# Patient Record
Sex: FEMALE | Race: BLACK OR AFRICAN AMERICAN | Employment: FULL TIME | URBAN - METROPOLITAN AREA
[De-identification: names, ages, dates, MRNs, and addresses within clinical notes are randomized per-mention and may not be internally consistent; named-entity substitution may affect disease eponyms.]

---

## 2017-07-20 ENCOUNTER — OFFICE VISIT (OUTPATIENT)
Dept: OBGYN CLINIC | Age: 37
End: 2017-07-20
Payer: COMMERCIAL

## 2017-07-20 VITALS — HEIGHT: 63 IN | BODY MASS INDEX: 31.71 KG/M2 | WEIGHT: 179 LBS

## 2017-07-20 DIAGNOSIS — D25.9 UTERINE LEIOMYOMA, UNSPECIFIED LOCATION: ICD-10-CM

## 2017-07-20 DIAGNOSIS — Z80.3 FAMILY HISTORY OF BREAST CANCER IN MOTHER: ICD-10-CM

## 2017-07-20 DIAGNOSIS — K59.09 CHRONIC CONSTIPATION: ICD-10-CM

## 2017-07-20 DIAGNOSIS — Z01.419 ENCOUNTER FOR ANNUAL ROUTINE GYNECOLOGICAL EXAMINATION: ICD-10-CM

## 2017-07-20 DIAGNOSIS — Z12.31 ENCOUNTER FOR MAMMOGRAM TO ESTABLISH BASELINE MAMMOGRAM: Primary | ICD-10-CM

## 2017-07-20 PROCEDURE — 99395 PREV VISIT EST AGE 18-39: CPT | Performed by: OBSTETRICS & GYNECOLOGY

## 2017-07-27 DIAGNOSIS — Z12.31 ENCOUNTER FOR MAMMOGRAM TO ESTABLISH BASELINE MAMMOGRAM: ICD-10-CM

## 2018-08-14 ENCOUNTER — OFFICE VISIT (OUTPATIENT)
Dept: FAMILY MEDICINE CLINIC | Age: 38
End: 2018-08-14
Payer: COMMERCIAL

## 2018-08-14 ENCOUNTER — OFFICE VISIT (OUTPATIENT)
Dept: OBGYN CLINIC | Age: 38
End: 2018-08-14
Payer: COMMERCIAL

## 2018-08-14 VITALS
SYSTOLIC BLOOD PRESSURE: 118 MMHG | WEIGHT: 179 LBS | DIASTOLIC BLOOD PRESSURE: 72 MMHG | BODY MASS INDEX: 31.71 KG/M2 | RESPIRATION RATE: 16 BRPM | HEIGHT: 63 IN | HEART RATE: 76 BPM

## 2018-08-14 VITALS
HEIGHT: 63 IN | BODY MASS INDEX: 31.71 KG/M2 | DIASTOLIC BLOOD PRESSURE: 72 MMHG | WEIGHT: 179 LBS | SYSTOLIC BLOOD PRESSURE: 118 MMHG

## 2018-08-14 DIAGNOSIS — N94.10 DYSPAREUNIA IN FEMALE: ICD-10-CM

## 2018-08-14 DIAGNOSIS — N63.10 LUMP OF RIGHT BREAST: ICD-10-CM

## 2018-08-14 DIAGNOSIS — R10.2 PELVIC PAIN: ICD-10-CM

## 2018-08-14 DIAGNOSIS — Z01.411 ENCOUNTER FOR GYNECOLOGICAL EXAMINATION WITH ABNORMAL FINDING: Primary | ICD-10-CM

## 2018-08-14 DIAGNOSIS — B37.9 CANDIDIASIS: ICD-10-CM

## 2018-08-14 DIAGNOSIS — N94.6 DYSMENORRHEA: ICD-10-CM

## 2018-08-14 DIAGNOSIS — D25.9 UTERINE LEIOMYOMA, UNSPECIFIED LOCATION: ICD-10-CM

## 2018-08-14 DIAGNOSIS — Z00.00 WELL ADULT EXAM: Primary | ICD-10-CM

## 2018-08-14 PROCEDURE — 99395 PREV VISIT EST AGE 18-39: CPT | Performed by: OBSTETRICS & GYNECOLOGY

## 2018-08-14 PROCEDURE — 99395 PREV VISIT EST AGE 18-39: CPT | Performed by: FAMILY MEDICINE

## 2018-08-14 RX ORDER — NYSTATIN 100000 U/G
CREAM TOPICAL
Qty: 1 TUBE | Refills: 2 | Status: SHIPPED | OUTPATIENT
Start: 2018-08-14 | End: 2021-03-29

## 2018-08-14 ASSESSMENT — PATIENT HEALTH QUESTIONNAIRE - PHQ9
2. FEELING DOWN, DEPRESSED OR HOPELESS: 0
SUM OF ALL RESPONSES TO PHQ9 QUESTIONS 1 & 2: 0
SUM OF ALL RESPONSES TO PHQ QUESTIONS 1-9: 0
SUM OF ALL RESPONSES TO PHQ QUESTIONS 1-9: 0
SUM OF ALL RESPONSES TO PHQ9 QUESTIONS 1 & 2: 0
SUM OF ALL RESPONSES TO PHQ QUESTIONS 1-9: 0
SUM OF ALL RESPONSES TO PHQ QUESTIONS 1-9: 0
1. LITTLE INTEREST OR PLEASURE IN DOING THINGS: 0
1. LITTLE INTEREST OR PLEASURE IN DOING THINGS: 0
2. FEELING DOWN, DEPRESSED OR HOPELESS: 0

## 2018-08-14 NOTE — PROGRESS NOTES
[facility] PHYSICIANS  COMPREHENSIVE CARE  [facility] [address]  [address] [facility] [address]  Dept: [phone]      Lauryn Flynn is a 41 y.o. female who presents today for follow up on her  medical conditions as noted below. Chief Complaint   Patient presents with    Annual Exam       Patient Active Problem List:     Hemorrhoids     GERD (gastroesophageal reflux disease)     History of threatened premature labor     High-risk pregnancy      spontaneous labor with term delivery     Past Medical History:   Diagnosis Date    Dysplasia of cervix       Past Surgical History:   Procedure Laterality Date    COLPOSCOPY      INTRAUTERINE DEVICE INSERTION      Mirena  removed 10/12/10    TOOTH EXTRACTION  14    WISDOM TOOTH EXTRACTION       Family History   Problem Relation Age of Onset    Breast Cancer Mother 52    Migraines Mother     High Blood Pressure Mother     High Cholesterol Mother     Heart Attack Father     Diabetes Paternal Grandmother     Hypertension Paternal Grandmother     Breast Cancer Maternal Grandmother     Breast Cancer Maternal Aunt         pre-menopausal    Breast Cancer Other         post-menapausal    Hypertension Other        Current Outpatient Prescriptions   Medication Sig Dispense Refill    nystatin (MYCOSTATIN) 642406 UNIT/GM cream Apply topically 2 times daily. 1 Tube 2     No current facility-administered medications for this visit. ALLERGIES:  No Known Allergies    Social History   Substance Use Topics    Smoking status: Current Some Day Smoker     Types: Cigars    Smokeless tobacco: Never Used    Alcohol use 0.0 oz/week      Comment: socially.         No results found for: LDLCALC, LDLCHOLESTEROL, HDL, BUN, CREATININE, GLUCOSE, LABA1C, LABMICR           Subjective:      HPI  She is here today for well visit she essentially is feeling fine she had a Pap today she is due for mammogram laboratory studies done she does have some yeast under her breasts is tried some over-the-counter powder which is not helping her    Review of Systems:     Constitutional: Negative for fever, appetite change and fatigue. Family social and medical history reviewed and unchanged     HENT: Negative. Negative for nosebleeds, trouble swallowing and neck pain. Eyes: Negative for photophobia and visual disturbance. Respiratory: Negative. Negative for chest tightness and shortness of breath. Cardiovascular: Negative. Negative for chest pain and leg swelling. Gastrointestinal: Negative. Negative for abdominal pain and blood in stool. Endocrine: Negative for cold intolerance and polyuria. Genitourinary: Negative for dysuria and hematuria. Musculoskeletal: Negative. Skin: Negative for rash. Allergic/Immunologic: Negative. Neurological: Negative. Negative for dizziness, weakness and numbness. Hematological: Negative. Negative for adenopathy. Does not bruise/bleed easily. Psychiatric/Behavioral: Negative for sleep disturbance, dysphoric mood and  decreased concentration. The patient is not nervous/anxious. Objective:     Physical Exam:     Nursing note and vitals reviewed. /72   Pulse 76   Resp 16   Ht 5' 3\" (1.6 m)   Wt 179 lb (81.2 kg)   LMP 08/02/2018   Breastfeeding? No   BMI 31.71 kg/m²   Constitutional: She is oriented to person, place, and time. She   appears well-developed and well-nourished. HENT:   Head: Normocephalic and atraumatic. Right Ear: External ear normal. Tympanic membrane is not erythematous. No middle ear effusion. Left Ear: External ear normal. Tympanic membrane is not erythematous. No middle ear effusion. Nose: No mucosal edema. Mouth/Throat: Oropharynx is clear and moist. No posterior oropharyngeal erythema. Eyes: Conjunctivae and EOM are normal. Pupils are equal, round, and reactive to light. Neck: Normal range of motion. Neck supple. No thyromegaly present.    Cardiovascular: Normal rate, regular rhythm and normal heart sounds. No murmur heard. Pulmonary/Chest: Effort normal and breath sounds normal. She has no wheezes. Shehas no rales. Abdominal: Soft. Bowel sounds are normal. She exhibits no distension and no mass. There is no tenderness. There is no rebound and no guarding. Genitourinary/Anorectal:deferred  Musculoskeletal: Normal range of motion. She exhibits no edema or tenderness. Lymphadenopathy: She has no cervical adenopathy. Neurological: She is alert and oriented to person, place, and time. She has normal reflexes. Skin: Skin is warm and dry. No rash noted. Psychiatric: She has a normal mood and affect. Her   behavior is normal.       Assessment:      1. Well adult exam    2. Candidiasis          Plan:      Call or return to clinic prn if these symptoms worsen or fail to improve as anticipated. I have reviewed the instructions with the patient, answering all questions to her satisfaction. No Follow-up on file.   Orders Placed This Encounter   Procedures    CBC Auto Differential     Standing Status:   Future     Standing Expiration Date:   8/14/2019    Comprehensive Metabolic Panel     Standing Status:   Future     Standing Expiration Date:   8/14/2019    Lipid Panel     Standing Status:   Future     Standing Expiration Date:   8/14/2019     Order Specific Question:   Is Patient Fasting?/# of Hours     Answer:   yes    T4, Free     Standing Status:   Future     Standing Expiration Date:   8/14/2019    Thyroid Peroxidase Antibody     Standing Status:   Future     Standing Expiration Date:   8/14/2019    TSH without Reflex     Standing Status:   Future     Standing Expiration Date:   8/14/2019    Vitamin D 25 Hydroxy     Standing Status:   Future     Standing Expiration Date:   8/14/2019    PTH, Intact     Standing Status:   Future     Standing Expiration Date:   8/14/2019    HIV Screen     Standing Status:   Future     Standing Expiration Date:   8/14/2019 Orders Placed This Encounter   Medications    nystatin (MYCOSTATIN) [id number] UNIT/GM cream     Sig: Apply topically 2 times daily. Dispense:  1 Tube     Refill:  2    will notify when get results and follow-up p.r.n.     Electronically signed by Ashanti Smyth DO on 8/14/2018 at 4:02 PM

## 2018-08-14 NOTE — PROGRESS NOTES
 0.0 oz/week     Comment: socially. Current Outpatient Prescriptions   Medication Sig Dispense Refill    nystatin (MYCOSTATIN) [id number] UNIT/GM cream Apply topically 2 times daily. 1 Tube 2     No current facility-administered medications for this visit. Allergies:  No Known Allergies    Gynecologic History:  Patient's last menstrual period was 2018. Sexually Active: Yes  STD History: Yes , HPV  Abnormal Pap History yes  Birth Control: Yes, vasectomy    Obstetric History       T1      L3     SAB0   TAB0   Ectopic0   Molar0   Multiple0   Live Births3      ______________________________________________________________________  REVIEW OF SYSTEMS:        Constitutional:  Unexpected weight change no  Neurological:  Frequent headaches  no  Ophthalmic:  Recent visual changes no  ENT:   Difficulty swallowing  no  Breast:              Masses   no     Respiratory:  Shortness of breath  no    Cardiovascular: Chest pain   no     Gastrointestinal: Chronic diarrhea/constipation yes   Urogenital:  Urinary incontinence  no                                         Heavy/irregular periods           no                                      Vaginal discharge                   no  Hematological: Bruises easy   no     Endocrine:  Nipple Discharge  no     Hot/Cold Intolerance  no   Psychological:  Mood and affect were wnl yes                                                                                                                                           Physical Exam:    Vitals:    18 1437   BP: 118/72   Site: Right Arm   Position: Sitting   Cuff Size: Medium Adult   Weight: 179 lb (81.2 kg)   Height: 5' 3\" (1.6 m)       General Appearance:  She does not appear to be in any distress. This  is a well developed, well nourished, well groomed female. Neurological:  The patient is alert and oriented to time, place, person, and situation without any noted sensory motor deficits.     Skin:  A follow-up. Return to the office in 1 year or when necessary  Patient was seen with total face to face time of 20 minutes. Leander Goddard M.D., M[facility]  [facility]  [place]

## 2018-08-21 LAB
ALBUMIN SERPL-MCNC: NORMAL G/DL
ALP BLD-CCNC: NORMAL U/L
ALT SERPL-CCNC: NORMAL U/L
ANION GAP SERPL CALCULATED.3IONS-SCNC: NORMAL MMOL/L
AST SERPL-CCNC: NORMAL U/L
BASOPHILS ABSOLUTE: NORMAL /ΜL
BASOPHILS RELATIVE PERCENT: NORMAL %
BILIRUB SERPL-MCNC: NORMAL MG/DL (ref 0.1–1.4)
BUN BLDV-MCNC: NORMAL MG/DL
CALCIUM SERPL-MCNC: NORMAL MG/DL
CHLORIDE BLD-SCNC: NORMAL MMOL/L
CHOLESTEROL, TOTAL: 156 MG/DL
CHOLESTEROL/HDL RATIO: 3.5
CO2: NORMAL MMOL/L
CREAT SERPL-MCNC: NORMAL MG/DL
EOSINOPHILS ABSOLUTE: NORMAL /ΜL
EOSINOPHILS RELATIVE PERCENT: NORMAL %
GFR CALCULATED: NORMAL
GLUCOSE BLD-MCNC: NORMAL MG/DL
HCT VFR BLD CALC: NORMAL % (ref 36–46)
HDLC SERPL-MCNC: 44 MG/DL (ref 35–70)
HEMOGLOBIN: NORMAL G/DL (ref 12–16)
HIV AG/AB: NORMAL
LDL CHOLESTEROL CALCULATED: 86 MG/DL (ref 0–160)
LYMPHOCYTES ABSOLUTE: NORMAL /ΜL
LYMPHOCYTES RELATIVE PERCENT: NORMAL %
MCH RBC QN AUTO: NORMAL PG
MCHC RBC AUTO-ENTMCNC: NORMAL G/DL
MCV RBC AUTO: NORMAL FL
MONOCYTES ABSOLUTE: NORMAL /ΜL
MONOCYTES RELATIVE PERCENT: NORMAL %
NEUTROPHILS ABSOLUTE: NORMAL /ΜL
NEUTROPHILS RELATIVE PERCENT: NORMAL %
PDW BLD-RTO: NORMAL %
PLATELET # BLD: NORMAL K/ΜL
PMV BLD AUTO: NORMAL FL
POTASSIUM SERPL-SCNC: NORMAL MMOL/L
PTH INTACT: NORMAL
RBC # BLD: NORMAL 10^6/ΜL
SODIUM BLD-SCNC: NORMAL MMOL/L
T4 FREE: NORMAL
TOTAL PROTEIN: NORMAL
TRIGL SERPL-MCNC: 132 MG/DL
TSH SERPL DL<=0.05 MIU/L-ACNC: NORMAL UIU/ML
VITAMIN D 25-HYDROXY: NORMAL
VITAMIN D2, 25 HYDROXY: NORMAL
VITAMIN D3,25 HYDROXY: NORMAL
VLDLC SERPL CALC-MCNC: 26 MG/DL
WBC # BLD: NORMAL 10^3/ML

## 2018-08-22 DIAGNOSIS — Z01.411 ENCOUNTER FOR GYNECOLOGICAL EXAMINATION WITH ABNORMAL FINDING: ICD-10-CM

## 2018-08-23 DIAGNOSIS — Z00.00 WELL ADULT EXAM: ICD-10-CM

## 2018-09-11 ENCOUNTER — TELEPHONE (OUTPATIENT)
Dept: OBGYN CLINIC | Age: 38
End: 2018-09-11

## 2019-09-17 ENCOUNTER — OFFICE VISIT (OUTPATIENT)
Dept: OBGYN CLINIC | Age: 39
End: 2019-09-17
Payer: COMMERCIAL

## 2019-09-17 VITALS
BODY MASS INDEX: 30.65 KG/M2 | HEIGHT: 63 IN | SYSTOLIC BLOOD PRESSURE: 131 MMHG | WEIGHT: 173 LBS | DIASTOLIC BLOOD PRESSURE: 87 MMHG | HEART RATE: 78 BPM

## 2019-09-17 DIAGNOSIS — N94.6 DYSMENORRHEA: ICD-10-CM

## 2019-09-17 DIAGNOSIS — D21.9 FIBROIDS: ICD-10-CM

## 2019-09-17 DIAGNOSIS — D25.9 UTERINE LEIOMYOMA, UNSPECIFIED LOCATION: ICD-10-CM

## 2019-09-17 DIAGNOSIS — Z01.419 ENCOUNTER FOR WELL WOMAN EXAM WITH ROUTINE GYNECOLOGICAL EXAM: Primary | ICD-10-CM

## 2019-09-17 DIAGNOSIS — N93.0 POSTCOITAL BLEEDING: ICD-10-CM

## 2019-09-17 DIAGNOSIS — Z12.31 ENCOUNTER FOR SCREENING MAMMOGRAM FOR MALIGNANT NEOPLASM OF BREAST: ICD-10-CM

## 2019-09-17 PROCEDURE — 99395 PREV VISIT EST AGE 18-39: CPT | Performed by: OBSTETRICS & GYNECOLOGY

## 2019-09-17 ASSESSMENT — PATIENT HEALTH QUESTIONNAIRE - PHQ9
SUM OF ALL RESPONSES TO PHQ9 QUESTIONS 1 & 2: 0
SUM OF ALL RESPONSES TO PHQ QUESTIONS 1-9: 0
SUM OF ALL RESPONSES TO PHQ QUESTIONS 1-9: 0
2. FEELING DOWN, DEPRESSED OR HOPELESS: 0
1. LITTLE INTEREST OR PLEASURE IN DOING THINGS: 0

## 2019-09-17 NOTE — PROGRESS NOTES
socially. Current Outpatient Medications   Medication Sig Dispense Refill    nystatin (MYCOSTATIN) [id number] UNIT/GM cream Apply topically 2 times daily. (Patient not taking: Reported on 2019) 1 Tube 2     No current facility-administered medications for this visit. Allergies:  No Known Allergies    Gynecologic History:  Patient's last menstrual period was 2019. Sexually Active: Yes  STD History: Yes, HPV  Abnormal Pap History yes  Birth Control: Yes, vasectomy    OB History    Para Term  AB Living   3 2 1 1 0 3   SAB TAB Ectopic Molar Multiple Live Births   0 0 0 0 0 3     ______________________________________________________________________  REVIEW OF SYSTEMS:        Constitutional:  Unexpected weight change no  Neurological:  Frequent headaches  no  Ophthalmic:  Recent visual changes no  ENT:   Difficulty swallowing  no  Breast:              Masses   no     Respiratory:  Shortness of breath  no    Cardiovascular: Chest pain   no     Gastrointestinal: Chronic diarrhea/constipation no   Urogenital:  Urinary incontinence  no                                         Heavy/irregular periods           yes                                      Vaginal discharge                   no  Hematological: Bruises easy   no     Endocrine:  Nipple Discharge  no     Hot/Cold Intolerance  no   Psychological:  Mood and affect were wnl yes                                                                                                                                           Physical Exam:    Vitals:    19 0937   BP: 131/87   Site: Left Upper Arm   Position: Sitting   Cuff Size: Medium Adult   Pulse: 78   Weight: 173 lb (78.5 kg)   Height: 5' 3\" (1.6 m)       General Appearance:  She does not appear to be in any distress. This  is a well developed, well nourished, well groomed female.         Neurological:  The patient is alert and oriented to time, place, person, and situation without any noted sensory motor deficits. Skin:  A brief inspection of the skin revealed no rashes or lesions. Neck:  The neck was supple. There is no tracheal deviation, thyromegaly or supraclavicular adenopathy appreciated. Breast:   The patients breasts were symmetrical.  Breasts are nontender and there  were no masses, discharge or pathologic skin changes. There is no supraclavicular or axillary adenopathy bilaterally. Respiratory: There was unlabored respiratory effort. Lungs clear to ascultation without wheezes, rales or rhonchi in all fields bilaterally. Cardiovascular:  Normal sinus rhythm with a regular rate and without murmur, rubs or gallops. Abdomen: The abdomen was soft and non-tender with no guarding, rebound, CVAT or rigidity. No hernias were appreciated. Bowel sounds were normally active. Pelvic exam:  No vulvar, vaginal or cervical lesions are noted. Normal vaginal discharge present, no significant cystocele, rectocele or enterocele noted. Uterus 12 to 14 weeks with a palpable anterior fundal fibroid and without CMT and adnexa nontender and without abnormal masses bilaterally. Extremities:  FROM and nontender without clubbing cyanosis or edema. ASSESSMENT:         ICD-10-CM    1. Encounter for well woman exam with routine gynecological exam Z01.419 PAP Smear   2. Uterine leiomyoma, unspecified location D25.9    3. Postcoital bleeding N93.0 US PELVIS COMPLETE     US Non OB Transvaginal   4. Fibroids D21.9 US PELVIS COMPLETE     US Non OB Transvaginal   5. Dysmenorrhea N94.6    6. Encounter for screening mammogram for malignant neoplasm of breast Z12.31 MARIANA DIGITAL SCREEN W OR WO CAD BILATERAL                   PLAN:  Read discussed E and M of uterine fibroids and she was given website references. Will discuss follow-up after ultrasound is resulted. Return to the office in 1 year or when necessary  Patient was seen with total face to face time of 20 minutes.      Grazyna Mcconnell Jodie Oh M.D., [address]  [facility]  [place]

## 2019-09-23 DIAGNOSIS — Z01.419 ENCOUNTER FOR WELL WOMAN EXAM WITH ROUTINE GYNECOLOGICAL EXAM: ICD-10-CM

## 2020-09-21 ENCOUNTER — OFFICE VISIT (OUTPATIENT)
Dept: OBGYN CLINIC | Age: 40
End: 2020-09-21
Payer: COMMERCIAL

## 2020-09-21 VITALS
SYSTOLIC BLOOD PRESSURE: 110 MMHG | HEIGHT: 63 IN | BODY MASS INDEX: 32.07 KG/M2 | DIASTOLIC BLOOD PRESSURE: 72 MMHG | WEIGHT: 181 LBS

## 2020-09-21 PROCEDURE — 99395 PREV VISIT EST AGE 18-39: CPT | Performed by: OBSTETRICS & GYNECOLOGY

## 2020-09-21 ASSESSMENT — PATIENT HEALTH QUESTIONNAIRE - PHQ9
SUM OF ALL RESPONSES TO PHQ QUESTIONS 1-9: 0
SUM OF ALL RESPONSES TO PHQ QUESTIONS 1-9: 0
2. FEELING DOWN, DEPRESSED OR HOPELESS: 0
SUM OF ALL RESPONSES TO PHQ9 QUESTIONS 1 & 2: 0
1. LITTLE INTEREST OR PLEASURE IN DOING THINGS: 0

## 2020-09-21 NOTE — PROGRESS NOTES
DATE OF VISIT:  20        History and Physical    Anirudh Winter    :  1980  CHIEF COMPLAINT:    Chief Complaint   Patient presents with    Annual Exam     Here for annual  Last pap 19 ascus HPV neg  Last mammogram 19 neg suggest MRI                     HPI :   Anirudh Winter is a 45 y.o. femaleGRAVIDA 3 PARA [address]003    Anirudh Winter returns today for her annual exam.  Ultrasound last year revealed multiple uterine fibroids both subserosal and one that appears to be submucosal.  She continues to have heavy, crampy menses and postcoital bleeding. Mammogram last year was negative and adjunct MRI was recommended. She is having regular bowel movements without constipation or diarrhea. She denies any involuntary loss of urine. She denies S/S consistent with COVID and is otherwise without any significant complaints today.   Mely Humphrey is still working at [employer] and her 3 children are all boys and doing fine.  _____________________________________________________________________  Past Medical History:   Diagnosis Date    Dysplasia of cervix                                                                    Past Surgical History:   Procedure Laterality Date    COLPOSCOPY      INTRAUTERINE DEVICE INSERTION      Mirena  removed 10/12/10    TOOTH EXTRACTION  15    WISDOM TOOTH EXTRACTION       Family History   Problem Relation Age of Onset    Breast Cancer Mother 52    Migraines Mother     High Blood Pressure Mother     High Cholesterol Mother     Heart Attack Father     Diabetes Paternal Grandmother     Hypertension Paternal Grandmother     Breast Cancer Maternal Grandmother     Breast Cancer Maternal Aunt         pre-menopausal    Breast Cancer Other         post-menapausal    Hypertension Other      Social History     Tobacco Use   Smoking Status Current Some Day Smoker    Types: Cigars   Smokeless Tobacco Never Used     Social History     Substance and Sexual Activity Alcohol Use Yes    Alcohol/week: 0.0 standard drinks    Comment: socially. Current Outpatient Medications   Medication Sig Dispense Refill    nystatin (MYCOSTATIN) [id number] UNIT/GM cream Apply topically 2 times daily. (Patient not taking: Reported on 2019) 1 Tube 2     No current facility-administered medications for this visit. Allergies:  No Known Allergies    Gynecologic History:  Patient's last menstrual period was 2020. Sexually Active: Yes  STD History: Yes, HR-HPV  Abnormal Pap History yes  Birth Control: Yes, jesus    OB History    Para Term  AB Living   3 2 1 1 0 3   SAB TAB Ectopic Molar Multiple Live Births   0 0 0 0 0 3     ______________________________________________________________________  REVIEW OF SYSTEMS:        Constitutional:  Unexpected weight change no  Neurological:  Frequent headaches  no  Ophthalmic:  Recent visual changes no  ENT:   Difficulty swallowing  no  Breast:              Masses   no     Respiratory:  Shortness of breath  no    Cardiovascular: Chest pain   no     Gastrointestinal: Chronic diarrhea/constipation no   Urogenital:  Urinary incontinence  no                                         Heavy/irregular periods           yes                                      Vaginal discharge                   no  Hematological: Bruises easy   no     Endocrine:  Nipple Discharge  no     Hot/Cold Intolerance  no   Psychological:  Mood and affect were wnl yes                                                                                                                                           Physical Exam:    Vitals:    20 0857   BP: 110/72   Site: Right Upper Arm   Position: Sitting   Cuff Size: Large Adult   Weight: 181 lb (82.1 kg)   Height: 5' 3\" (1.6 m)       General Appearance:  She does not appear to be in any distress. This  is a well developed, well nourished, well groomed female.         Neurological:  The patient is alert and oriented to time, place, person, and situation without any noted sensory motor deficits. Skin:  A brief inspection of the skin revealed no rashes or lesions. Neck:  The neck was supple. There is no tracheal deviation, thyromegaly or supraclavicular adenopathy appreciated. Breast:   The patients breasts were symmetrical.  Breasts are nontender and there  were no masses, discharge or pathologic skin changes. There is no supraclavicular or axillary adenopathy bilaterally. Respiratory: There was unlabored respiratory effort. Lungs clear to ascultation without wheezes, rales or rhonchi in all fields bilaterally. Cardiovascular:  Normal sinus rhythm with a regular rate and without murmur, rubs or gallops. Abdomen: The abdomen was soft and non-tender with no guarding, rebound, CVAT or rigidity. No hernias were appreciated. Bowel sounds were normally active. Pelvic exam:  No vulvar, vaginal or cervical lesions are noted. Normal vaginal discharge present, no significant cystocele, rectocele or enterocele noted. Uterus unchanged from last year, irregular in contour and approximately 14 weeks size. No CMT and adnexa nontender and without abnormal masses bilaterally. Extremities:  FROM and nontender without clubbing cyanosis or edema. ASSESSMENT:         ICD-10-CM    1. Encounter for gynecological examination with abnormal finding  Z01.411 PAP SMEAR   2. Visit for screening mammogram  Z12.31 MARIANA DIGITAL SCREEN W OR WO CAD BILATERAL     MRI BREAST BILATERAL W WO CONTRAST   3. Family history of malignant neoplasm of breast in relative diagnosed when older than 39years of age  Z80.2 MRI BREAST BILATERAL W [address]   4. Dysmenorrhea  N94.6    5. Submucous and subserous leiomyoma of uterus  D25.0     D25.2    6. Postcoital bleeding  N93.0                    PLAN:  Discussed genetic counseling due to family history of breast cancer and she desires this.   We discussed management of uterine fibroids

## 2021-03-29 ENCOUNTER — OFFICE VISIT (OUTPATIENT)
Dept: OBGYN CLINIC | Age: 41
End: 2021-03-29
Payer: COMMERCIAL

## 2021-03-29 VITALS
SYSTOLIC BLOOD PRESSURE: 132 MMHG | BODY MASS INDEX: 33.83 KG/M2 | HEART RATE: 75 BPM | DIASTOLIC BLOOD PRESSURE: 69 MMHG | TEMPERATURE: 98.6 F | WEIGHT: 191 LBS

## 2021-03-29 DIAGNOSIS — N63.0 BREAST LUMP: Primary | ICD-10-CM

## 2021-03-29 DIAGNOSIS — N63.10 BREAST MASS, RIGHT: Primary | ICD-10-CM

## 2021-03-29 PROCEDURE — 99213 OFFICE O/P EST LOW 20 MIN: CPT | Performed by: OBSTETRICS & GYNECOLOGY

## 2021-03-29 NOTE — PROGRESS NOTES
Physical Exam  Chest:      Chest wall: No mass, deformity, tenderness or edema. Breasts: Breasts are symmetrical.         Right: Mass present. No swelling, bleeding, inverted nipple, nipple discharge or tenderness. Left: Mass present. No swelling, bleeding, inverted nipple, nipple discharge, skin change or tenderness. Comments: Mike Long returns to the office today after discovering a lump in her right breast.  She states that she found it while showering in January. It is nontender and has not changed in size. She denies any previous history. She denies any changes in the skin, nipple discharge or asymmetry. She is otherwise without any complaints today. Physical exam:  -------------------------              03/29/21                    0920        -------------------------   BP:         132/69        Pulse:        75          Temp:   98.6 °F (37 °C)  -------------------------    On physical exam there is no gross asymmetry. In the right breast at the area in question there is a nontender mobile approximately 8-10 mm mass at 11:00 consistent with a fibroadenoma. There are also similar changes in the left breast at 11 and 3:00. Diagnostic bilateral mammogram with ultrasound was ordered. She will be contacted with results and any recommendations. Plan to follow-up at annual exam in September. Lymphadenopathy:      Upper Body:      Right upper body: No supraclavicular, axillary or pectoral adenopathy. Left upper body: No supraclavicular, axillary or pectoral adenopathy.

## 2021-03-29 NOTE — PATIENT INSTRUCTIONS
We will order a diagnostic mammogram and ultrasound of the right breast.  You will still have routine screening on the left breast.  We will contact you with those results and any recommendations. Otherwise plan on following up for your annual exam in September.

## 2021-03-30 LAB — MAMMOGRAPHY, EXTERNAL: NORMAL

## 2021-04-01 DIAGNOSIS — N63.10 BREAST MASS, RIGHT: ICD-10-CM

## 2021-04-14 ENCOUNTER — OFFICE VISIT (OUTPATIENT)
Dept: FAMILY MEDICINE CLINIC | Age: 41
End: 2021-04-14
Payer: COMMERCIAL

## 2021-04-14 VITALS
BODY MASS INDEX: 33.31 KG/M2 | WEIGHT: 188 LBS | HEART RATE: 73 BPM | TEMPERATURE: 97.7 F | HEIGHT: 63 IN | SYSTOLIC BLOOD PRESSURE: 126 MMHG | DIASTOLIC BLOOD PRESSURE: 80 MMHG | OXYGEN SATURATION: 99 %

## 2021-04-14 DIAGNOSIS — D64.9 ANEMIA, UNSPECIFIED TYPE: ICD-10-CM

## 2021-04-14 DIAGNOSIS — Z00.00 WELL ADULT EXAM: Primary | ICD-10-CM

## 2021-04-14 DIAGNOSIS — K21.00 GASTROESOPHAGEAL REFLUX DISEASE WITH ESOPHAGITIS WITHOUT HEMORRHAGE: ICD-10-CM

## 2021-04-14 LAB
ALBUMIN SERPL-MCNC: NORMAL G/DL
ALP BLD-CCNC: NORMAL U/L
ALT SERPL-CCNC: NORMAL U/L
ANION GAP SERPL CALCULATED.3IONS-SCNC: NORMAL MMOL/L
AST SERPL-CCNC: NORMAL U/L
BASOPHILS ABSOLUTE: NORMAL
BASOPHILS RELATIVE PERCENT: NORMAL
BILIRUB SERPL-MCNC: NORMAL MG/DL
BUN BLDV-MCNC: NORMAL MG/DL
CALCIUM SERPL-MCNC: NORMAL MG/DL
CHLORIDE BLD-SCNC: NORMAL MMOL/L
CO2: NORMAL
CREAT SERPL-MCNC: NORMAL MG/DL
EOSINOPHILS ABSOLUTE: NORMAL
EOSINOPHILS RELATIVE PERCENT: NORMAL
FOLATE: NORMAL
GFR CALCULATED: NORMAL
GLUCOSE BLD-MCNC: NORMAL MG/DL
HCT VFR BLD CALC: NORMAL %
HEMOGLOBIN: NORMAL
IRON: NORMAL
LYMPHOCYTES ABSOLUTE: NORMAL
LYMPHOCYTES RELATIVE PERCENT: NORMAL
MCH RBC QN AUTO: NORMAL PG
MCHC RBC AUTO-ENTMCNC: NORMAL G/DL
MCV RBC AUTO: NORMAL FL
MONOCYTES ABSOLUTE: NORMAL
MONOCYTES RELATIVE PERCENT: NORMAL
NEUTROPHILS ABSOLUTE: NORMAL
NEUTROPHILS RELATIVE PERCENT: NORMAL
PDW BLD-RTO: NORMAL %
PLATELET # BLD: NORMAL 10*3/UL
PMV BLD AUTO: NORMAL FL
POTASSIUM SERPL-SCNC: NORMAL MMOL/L
RBC # BLD: NORMAL 10*6/UL
SODIUM BLD-SCNC: NORMAL MMOL/L
T4 FREE: NORMAL
TOTAL IRON BINDING CAPACITY: NORMAL
TOTAL PROTEIN: NORMAL
TSH SERPL DL<=0.05 MIU/L-ACNC: NORMAL M[IU]/L
VITAMIN B-12: NORMAL
VITAMIN D 25-HYDROXY: NORMAL
VITAMIN D2, 25 HYDROXY: NORMAL
VITAMIN D3,25 HYDROXY: NORMAL
WBC # BLD: NORMAL 10*3/UL

## 2021-04-14 PROCEDURE — 99396 PREV VISIT EST AGE 40-64: CPT | Performed by: FAMILY MEDICINE

## 2021-04-14 RX ORDER — OMEPRAZOLE 20 MG/1
20 CAPSULE, DELAYED RELEASE ORAL DAILY
Qty: 30 CAPSULE | Refills: 11 | Status: SHIPPED | OUTPATIENT
Start: 2021-04-14

## 2021-04-14 ASSESSMENT — PATIENT HEALTH QUESTIONNAIRE - PHQ9
SUM OF ALL RESPONSES TO PHQ9 QUESTIONS 1 & 2: 0
1. LITTLE INTEREST OR PLEASURE IN DOING THINGS: 0
SUM OF ALL RESPONSES TO PHQ QUESTIONS 1-9: 0
2. FEELING DOWN, DEPRESSED OR HOPELESS: 0
SUM OF ALL RESPONSES TO PHQ QUESTIONS 1-9: 0

## 2021-04-14 NOTE — PROGRESS NOTES
[facility]  [address] [place] [address] Dr [address]O [address] [facility] [address]  Dept: [phone]      Deborah Arnold is a 37 y.o. female who presents today for follow up on her  medical conditions as noted below. Chief Complaint   Patient presents with    Annual Exam    Gastroesophageal Reflux       Patient Active Problem List:     Hemorrhoids     GERD (gastroesophageal reflux disease)     History of threatened premature labor     High-risk pregnancy      spontaneous labor with term delivery     Past Medical History:   Diagnosis Date    Dysplasia of cervix       Past Surgical History:   Procedure Laterality Date    COLPOSCOPY      INTRAUTERINE DEVICE INSERTION      Mirena  removed 10/12/10    TOOTH EXTRACTION  14    WISDOM TOOTH EXTRACTION       Family History   Problem Relation Age of Onset    Breast Cancer Mother 52    Migraines Mother     High Blood Pressure Mother     High Cholesterol Mother     Heart Attack Father     Diabetes Paternal Grandmother     Hypertension Paternal Grandmother     Breast Cancer Maternal Grandmother     Breast Cancer Maternal Aunt         pre-menopausal    Breast Cancer Other         post-menapausal    Hypertension Other        Current Outpatient Medications   Medication Sig Dispense Refill    omeprazole (PRILOSEC) 20 MG delayed release capsule Take 1 capsule by mouth daily 30 capsule 11     No current facility-administered medications for this visit. ALLERGIES:  No Known Allergies    Social History     Tobacco Use    Smoking status: Current Some Day Smoker     Types: Cigars    Smokeless tobacco: Never Used   Substance Use Topics    Alcohol use: Yes     Alcohol/week: 0.0 standard drinks     Comment: socially.         LDL Calculated (mg/dL)   Date Value   2018 86     HDL (mg/dL)   Date Value   2018 44              Subjective:      HPI  She is being seen today for well visit overall she is doing pretty good she has been having some heartburn issues lately she has a history of that in the past and has been on omeprazole which worked well for her  There is a questionable history of some anemia and definitely vitamin D deficiency in the past  She has not done blood work in quite some time she did get a mammogram done just recently    Review of Systems:     Constitutional: Negative for fever, appetite change and fatigue. Family social and medical history reviewed and unchanged     HENT: Negative. Negative for nosebleeds, trouble swallowing and neck pain. Eyes: Negative for photophobia and visual disturbance. Respiratory: Negative. Negative for chest tightness and shortness of breath. Cardiovascular: Negative. Negative for chest pain and leg swelling. Gastrointestinal: Negative. Negative for abdominal pain and blood in stool. Endocrine: Negative for cold intolerance and polyuria. Genitourinary: Negative for dysuria and hematuria. Musculoskeletal: Negative. Skin: Negative for rash. Allergic/Immunologic: Negative. Neurological: Negative. Negative for dizziness, weakness and numbness. Hematological: Negative. Negative for adenopathy. Does not bruise/bleed easily. Psychiatric/Behavioral: Negative for sleep disturbance, dysphoric mood and  decreased concentration. The patient is not nervous/anxious. Objective:     Physical Exam:     Nursing note and vitals reviewed. /80   Pulse 73   Temp 97.7 °F (36.5 °C)   Ht 5' 3\" (1.6 m)   Wt 188 lb (85.3 kg)   SpO2 99%   BMI 33.30 kg/m²   Constitutional: She is oriented to person, place, and time. She   appears well-developed and well-nourished. HENT:   Head: Normocephalic and atraumatic. Right Ear: External ear normal. Tympanic membrane is not erythematous. No middle ear effusion. Left Ear: External ear normal. Tympanic membrane is not erythematous. No middle ear effusion. Nose: No mucosal edema. Mouth/Throat: Oropharynx is clear and moist. No posterior oropharyngeal erythema. Eyes: Conjunctivae and EOM are normal. Pupils are equal, round, and reactive to light. Neck: Normal range of motion. Neck supple. No thyromegaly present. Cardiovascular: Normal rate, regular rhythm and normal heart sounds. No murmur heard. Pulmonary/Chest: Effort normal and breath sounds normal. She has no wheezes. Shehas no rales. Abdominal: Soft. Bowel sounds are normal. She exhibits no distension and no mass. There is no tenderness. There is no rebound and no guarding. Genitourinary/Anorectal:deferred  Musculoskeletal: Normal range of motion. She exhibits no edema or tenderness. Lymphadenopathy: She has no cervical adenopathy. Neurological: She is alert and oriented to person, place, and time. She has normal reflexes. Skin: Skin is warm and dry. No rash noted. Psychiatric: She has a normal mood and affect. Her   behavior is normal.       Assessment:      1. Well adult exam    2. Gastroesophageal reflux disease with esophagitis without hemorrhage    3. Anemia, unspecified type          Plan:      Call or return to clinic prn if these symptoms worsen or fail to improve as anticipated. I have reviewed the instructions with the patient, answering all questions to her satisfaction. Return if symptoms worsen or fail to improve.   Orders Placed This Encounter   Procedures    CBC Auto Differential     Standing Status:   Future     Standing Expiration Date:   10/14/2021    Comprehensive Metabolic Panel     Standing Status:   Future     Standing Expiration Date:   10/14/2021    T4, Free     Standing Status:   Future     Standing Expiration Date:   10/14/2021    TSH without Reflex     Standing Status:   Future     Standing Expiration Date:   10/14/2021    Vitamin D 25 Hydroxy     Standing Status:   Future     Standing Expiration Date:   4/14/2022    Iron and TIBC     Standing Status:   Future     Standing Expiration Date:   5/14/2021     Order Specific Question:   Is Patient Fasting? Answer:   yes     Order Specific Question:   No of Hours?      Answer:   15    Vitamin B12 & Folate     Standing Status:   Future     Standing Expiration Date:   5/14/2021     Orders Placed This Encounter   Medications    omeprazole (PRILOSEC) 20 MG delayed release capsule     Sig: Take 1 capsule by mouth daily     Dispense:  30 capsule     Refill:  11     Restart omeprazole get laboratory studies done if her stomach starts to feel better after she has been on omeprazole she can follow back up in a month and start on diet medication that she requested being done  Electronically signed by Buddy Brown DO on 4/14/2021 at 11:47 AM

## 2021-04-15 DIAGNOSIS — Z00.00 WELL ADULT EXAM: ICD-10-CM

## 2021-04-15 DIAGNOSIS — D64.9 ANEMIA, UNSPECIFIED TYPE: ICD-10-CM

## 2021-09-23 ENCOUNTER — OFFICE VISIT (OUTPATIENT)
Dept: OBGYN CLINIC | Age: 41
End: 2021-09-23
Payer: COMMERCIAL

## 2021-09-23 VITALS
SYSTOLIC BLOOD PRESSURE: 110 MMHG | DIASTOLIC BLOOD PRESSURE: 72 MMHG | HEIGHT: 63 IN | BODY MASS INDEX: 32.43 KG/M2 | WEIGHT: 183 LBS

## 2021-09-23 DIAGNOSIS — Z01.411 ENCOUNTER FOR GYNECOLOGICAL EXAMINATION WITH ABNORMAL FINDING: Primary | ICD-10-CM

## 2021-09-23 DIAGNOSIS — N92.1 MENOMETRORRHAGIA: ICD-10-CM

## 2021-09-23 DIAGNOSIS — N94.6 DYSMENORRHEA: ICD-10-CM

## 2021-09-23 DIAGNOSIS — D25.0 SUBMUCOUS AND SUBSEROUS LEIOMYOMA OF UTERUS: ICD-10-CM

## 2021-09-23 DIAGNOSIS — Z12.31 VISIT FOR SCREENING MAMMOGRAM: ICD-10-CM

## 2021-09-23 DIAGNOSIS — D25.2 SUBMUCOUS AND SUBSEROUS LEIOMYOMA OF UTERUS: ICD-10-CM

## 2021-09-23 PROCEDURE — 99396 PREV VISIT EST AGE 40-64: CPT | Performed by: OBSTETRICS & GYNECOLOGY

## 2021-09-23 ASSESSMENT — PATIENT HEALTH QUESTIONNAIRE - PHQ9
1. LITTLE INTEREST OR PLEASURE IN DOING THINGS: 0
SUM OF ALL RESPONSES TO PHQ QUESTIONS 1-9: 0
SUM OF ALL RESPONSES TO PHQ QUESTIONS 1-9: 0
2. FEELING DOWN, DEPRESSED OR HOPELESS: 0
SUM OF ALL RESPONSES TO PHQ9 QUESTIONS 1 & 2: 0
SUM OF ALL RESPONSES TO PHQ QUESTIONS 1-9: 0

## 2021-09-23 NOTE — PROGRESS NOTES
DATE OF VISIT:  21        History and Physical    Bianca Atkins    :  1980  CHIEF COMPLAINT:    Chief Complaint   Patient presents with    Annual Exam     Here for annual Last pap20 ascus hpv neg last mamm 21 had Right Diag neg                    HPI :   Bianca Atkins is a 37 y.o. femaleGRAVIDA 3 [address]    Bianca Atkins returns today for her annual exam.  She has a known history of uterine fibroids and continues to have timely but long cycles. She states the first 3 days are light in the next 3 days are heavy. Occasionally she has moderately severe to severe cramping. She is having regular bowel movements without constipation or diarrhea. She denies any UTI symptoms or involuntary loss of urine. She is otherwise without any significant complaints today. Roxy Macdonald is still working at [employer], was vaccine hesitant but is now going to proceed.   _____________________________________________________________________  Past Medical History:   Diagnosis Date    Dysplasia of cervix                                                                    Past Surgical History:   Procedure Laterality Date    COLPOSCOPY      INTRAUTERINE DEVICE INSERTION      Mirena  removed 10/12/10    TOOTH EXTRACTION  14    WISDOM TOOTH EXTRACTION       Family History   Problem Relation Age of Onset    Breast Cancer Mother 52    Migraines Mother     High Blood Pressure Mother     High Cholesterol Mother     Heart Attack Father     Diabetes Paternal Grandmother     Hypertension Paternal Grandmother     Breast Cancer Maternal Grandmother     Breast Cancer Maternal Aunt         pre-menopausal    Breast Cancer Other         post-menapausal    Hypertension Other      Social History     Tobacco Use   Smoking Status Current Some Day Smoker    Types: Cigars   Smokeless Tobacco Never Used     Social History     Substance and Sexual Activity   Alcohol Use Yes    Alcohol/week: 0.0 standard drinks Comment: socially. Current Outpatient Medications   Medication Sig Dispense Refill    omeprazole (PRILOSEC) 20 MG delayed release capsule Take 1 capsule by mouth daily 30 capsule 11     No current facility-administered medications for this visit. Allergies:  No Known Allergies    Gynecologic History:  Patient's last menstrual period was 2021. Sexually Active: Yes  STD History: Yes, HR-HPV  Abnormal Pap History yes  Birth Control: Yes,jesus    OB History    Para Term  AB Living   3 2 1 1 0 3   SAB TAB Ectopic Molar Multiple Live Births   0 0 0 0 0 3     ______________________________________________________________________  REVIEW OF SYSTEMS:        Constitutional:  Unexpected weight change no  Neurological:  Frequent headaches  no  Ophthalmic:  Recent visual changes no  ENT:   Difficulty swallowing  no  Breast:              Masses   no     Respiratory:  Shortness of breath  no    Cardiovascular: Chest pain   no     Gastrointestinal: Chronic diarrhea/constipation no   Urogenital:  Urinary incontinence  no                                         Heavy/irregular periods           yes                                      Vaginal discharge                   yes  Hematological: Bruises easy   no     Endocrine:  Nipple Discharge  no     Hot/Cold Intolerance  no   Psychological:  Mood and affect were wnl yes                                                                                                                                           Physical Exam:    Vitals:    21 0944   BP: 110/72   Site: Right Upper Arm   Position: Sitting   Cuff Size: Large Adult   Weight: 183 lb (83 kg)   Height: 5' 3\" (1.6 m)       General Appearance:  She does not appear to be in any distress. This  is a well developed, well nourished, well groomed female. Neurological:  The patient is alert and oriented to time, place, person, and situation without any noted sensory motor deficits.     Skin:  A brief inspection of the skin revealed no rashes or lesions. Neck:  The neck was supple. There is no tracheal deviation, thyromegaly or supraclavicular adenopathy appreciated. Breast:   The patients breasts were symmetrical.  Breasts are nontender and there  were no masses, discharge or pathologic skin changes. There is no supraclavicular or axillary adenopathy bilaterally. Respiratory: There was unlabored respiratory effort. Lungs clear to ascultation without wheezes, rales or rhonchi in all fields bilaterally. Cardiovascular:  Normal sinus rhythm with a regular rate and without murmur, rubs or gallops. Abdomen: The abdomen was soft and non-tender with no guarding, rebound, CVAT or rigidity. No hernias were appreciated. Bowel sounds were normally active. Pelvic exam:  No vulvar, vaginal or cervical lesions are noted. Normal vaginal discharge present, no significant cystocele, rectocele or enterocele noted. Uterus 16 weeks with multiple fibroids palpable and without CMT and adnexa nontender and without abnormal masses bilaterally. Extremities:  FROM and nontender without clubbing cyanosis or edema. ASSESSMENT:         ICD-10-CM    1. Encounter for gynecological examination with abnormal finding  Z01.411 PAP SMEAR   2. Visit for screening mammogram  Z12.31    3. Submucous and subserous leiomyoma of uterus  D25.0     D25.2    4. Menometrorrhagia  N92.1    5. Dysmenorrhea  N94.6                    PLAN:  We did discuss radiofrequency ablation and she was given information on Acessa. Return to the office in 1 year or when necessary    Camacho Allison M.D., [address]  [address] [facility]  [place]

## 2021-09-23 NOTE — PATIENT INSTRUCTIONS
Please remember to get your next mammogram this upcoming March. We will contact you with the results of today's Pap smear. Please read the information given to you on The Acessa procedure. Return to the office in 1 year or as needed. Have a safe and healthy year!

## 2021-10-11 DIAGNOSIS — Z01.411 ENCOUNTER FOR GYNECOLOGICAL EXAMINATION WITH ABNORMAL FINDING: ICD-10-CM

## 2022-11-30 NOTE — PROGRESS NOTES
[facility] Lisette Cover  [address] [place] [address] [address]       DATE OF VISIT: 2022      History and Physical    Stephania Almonte    :  1980  CHIEF COMPLAINT:  No chief complaint on file. HPI :   Stephania Almonte is a 40 y.o. femaleGRAVIDA 3 PARA 3003   PCP: Mehnaz Garcia DO    Stephania Almonte returns today for her annual exam.  She has a known history of uterine fibroids and continues to have timely but long cycles. She states the first 3 days are light in the next 3 days are heavy. Occasionally she has moderately severe to severe cramping. She is having regular bowel movements without constipation or diarrhea. She denies any UTI symptoms or involuntary loss of urine. She is otherwise without any significant complaints today. Derek Acevedo is still working at [employer] was vaccine hesitant but is now going to proceed  Stephania Almonte returns today for her annual exam.  She is having regular bowel movements without constipation or diarrhea. She denies any involuntary loss of urine.   She is otherwise without any significant complaints today.  _____________________________________________________________________  Past Medical History:   Diagnosis Date    Dysplasia of cervix                                                                    Past Surgical History:   Procedure Laterality Date    COLPOSCOPY      INTRAUTERINE DEVICE INSERTION      Mirena  removed 10/12/10    TOOTH EXTRACTION  15    WISDOM TOOTH EXTRACTION       Family History   Problem Relation Age of Onset    Breast Cancer Mother 52    Migraines Mother     High Blood Pressure Mother     High Cholesterol Mother     Heart Attack Father     Diabetes Paternal Grandmother     Hypertension Paternal Grandmother     Breast Cancer Maternal Grandmother     Breast Cancer Maternal Aunt         pre-menopausal    Breast Cancer Other         post-menapausal Hypertension Other      Social History     Tobacco Use   Smoking Status Some Days    Types: Cigars   Smokeless Tobacco Never     Social History     Substance and Sexual Activity   Alcohol Use Yes    Alcohol/week: 0.0 standard drinks    Comment: socially. Current Outpatient Medications   Medication Sig Dispense Refill    omeprazole (PRILOSEC) 20 MG delayed release capsule Take 1 capsule by mouth daily 30 capsule 11     No current facility-administered medications for this visit. Allergies:  No Known Allergies    Gynecologic History:  No LMP recorded. Sexually Active: Yes  STD History: Yes: HR-HPV  Abnormal Pap History yes  Birth Control: Yes,jesus    OB History    Para Term  AB Living   3 2 1 1 0 3   SAB IAB Ectopic Molar Multiple Live Births   0 0 0 0 0 3     ______________________________________________________________________  REVIEW OF SYSTEMS:        Constitutional:  Unexpected weight change no  Neurological:  Frequent headaches  no  Ophthalmic:  Recent visual changes no  ENT:   Difficulty swallowing  no  Breast:              Masses   no     Respiratory:  Shortness of breath  no    Cardiovascular: Chest pain   no     Gastrointestinal: Chronic diarrhea/constipation no   Urogenital:  Urinary incontinence  no                                         Heavy/irregular periods           yes                                      Vaginal discharge                   no  Hematological: Bruises easy   no     Endocrine:  Nipple Discharge  no     Hot/Cold Intolerance  no   Psychological:  Mood and affect were wnl yes                                                                                                                                           Physical Exam:    There were no vitals filed for this visit. General Appearance:  She does not appear to be in any distress. This  is a well developed, well nourished, well groomed female.         Neurological:  The patient is alert and oriented to time, place, person, and situation without any noted sensory motor deficits. Skin:  A brief inspection of the skin revealed no rashes or lesions. Neck:  The neck was supple. There is no tracheal deviation, thyromegaly or supraclavicular adenopathy appreciated. Breast:   The patients breasts were symmetrical.  Breasts are nontender and there  were no masses, discharge or pathologic skin changes. There is no supraclavicular or axillary adenopathy bilaterally. Respiratory: There was unlabored respiratory effort. Lungs clear to ascultation without wheezes, rales or rhonchi in all fields bilaterally. Cardiovascular:  Normal sinus rhythm with a regular rate and without murmur, rubs or gallops. Abdomen: The abdomen was soft and non-tender with no guarding, rebound, CVAT or rigidity. No hernias were appreciated. Bowel sounds were normally active. Pelvic exam:  No vulvar, vaginal or cervical lesions are noted. Normal vaginal discharge present, no significant cystocele, rectocele or enterocele noted. Uterus unchanged at approximately 16 weeks with multiple palpable fibroids. Nongravid and without CMT and adnexa nontender and without abnormal masses bilaterally. Rectum without external lesions noted. Extremities:  FROM and nontender without clubbing cyanosis or edema. ASSESSMENT:         ICD-10-CM    1. Encounter for gynecological examination with abnormal finding  Z01.411       2. Visit for screening mammogram  Z12.31       3. Menometrorrhagia  N92.1       4. Dysmenorrhea  N94.6       5. Submucous and subserous leiomyoma of uterus  D25.0     D25.2                       PLAN:  If Negative Cytology, Follow-up screening per current guidelines. Mammograms every 1year. If 38 yo and last mammogram was negative. Mixed incontinence - discussed bladder training and kegel exercises. Info given. Calcium and Vitamin D dosing reviewed.   Colonoscopy screening reviewed as well as onset for bone density testing. Birth control and barrier recommendations discussed. STD counseling and prevention reviewed. Routine health maintenance per patients PCP. Return to the office in 1 year or when necessary  Patient was seen with total face to face time of 20 minutes. Umesh Bautista M.D., [address] [facility] [address] [facility]  [place]

## 2022-11-30 NOTE — PATIENT INSTRUCTIONS
Please read the information given to you on hysterectomy. For more information you may want to go to the [employer] of obstetrics and gynecology or [employer] websites. Please get your mammogram done as scheduled. We will also schedule a pelvic ultrasound in the office. We will contact you with that result as well as today's Pap and biopsy. We will follow-up with recommendations after your biopsy and pelvic ultrasound. Otherwise plan on returning to the office in 1 year or as needed. Happy holidays and have a fantastic year!

## 2022-12-01 ENCOUNTER — OFFICE VISIT (OUTPATIENT)
Dept: OBGYN CLINIC | Age: 42
End: 2022-12-01

## 2022-12-01 VITALS
WEIGHT: 172 LBS | DIASTOLIC BLOOD PRESSURE: 77 MMHG | HEIGHT: 63 IN | BODY MASS INDEX: 30.48 KG/M2 | SYSTOLIC BLOOD PRESSURE: 128 MMHG

## 2022-12-01 DIAGNOSIS — N94.6 DYSMENORRHEA: ICD-10-CM

## 2022-12-01 DIAGNOSIS — D25.0 SUBMUCOUS AND SUBSEROUS LEIOMYOMA OF UTERUS: ICD-10-CM

## 2022-12-01 DIAGNOSIS — N92.1 MENOMETRORRHAGIA: ICD-10-CM

## 2022-12-01 DIAGNOSIS — Z01.411 ENCOUNTER FOR GYNECOLOGICAL EXAMINATION WITH ABNORMAL FINDING: Primary | ICD-10-CM

## 2022-12-01 DIAGNOSIS — D25.2 SUBMUCOUS AND SUBSEROUS LEIOMYOMA OF UTERUS: ICD-10-CM

## 2022-12-01 DIAGNOSIS — Z12.31 VISIT FOR SCREENING MAMMOGRAM: ICD-10-CM

## 2022-12-01 NOTE — PROGRESS NOTES
[address] [facility] [address] [place] [address]       DATE OF VISIT:  22        History and Physical    Wade Agudelo    :  1980  CHIEF COMPLAINT:    Chief Complaint   Patient presents with    Annual Exam     Prev Pap: 21 ASCUS/HPV Neg; Prev rui:                   PI :   Wade Agudelo is a 40 y.o. femaleGRAVIDA 3 PARA 3003   PCP: Poli Aranda DO    Wade Agudelo returns today for her annual exam.  She has a known history of uterine fibroids and continues to have timely but long cycles. At last year's annual she presented stating the first 3 days are light and the next 3 days are heavy. Occasionally she has moderately severe to severe cramping. She presents today stating that she does still continue to have heavy irregular cycles but she is having intermenstrual bleeding that is heavy and crampy as her menses. She is also having increasing pressure on the bladder causing urinary frequency. She is having regular bowel movements without constipation or diarrhea. She denies any UTI symptoms or involuntary loss of urine. She is otherwise without any significant complaints today.   Pillo is still working at [employer] and did get her COVID vacs x2 and is overdue for booster.    _____________________________________________________________________  Past Medical History:   Diagnosis Date    Dysplasia of cervix                                                                    Past Surgical History:   Procedure Laterality Date    COLPOSCOPY      INTRAUTERINE DEVICE INSERTION      Mirena  removed 10/12/10    TOOTH EXTRACTION  15    WISDOM TOOTH EXTRACTION       Family History   Problem Relation Age of Onset    Breast Cancer Mother 52    Migraines Mother     High Blood Pressure Mother     High Cholesterol Mother     Heart Attack Father     Diabetes Paternal Grandmother     Hypertension Paternal Grandmother     Breast Cancer Maternal Grandmother     Breast Cancer Maternal Aunt         pre-menopausal    Breast Cancer Other         post-menapausal    Hypertension Other      Social History     Tobacco Use   Smoking Status Some Days    Types: Cigars   Smokeless Tobacco Never     Social History     Substance and Sexual Activity   Alcohol Use Yes    Alcohol/week: 0.0 standard drinks    Comment: socially. Current Outpatient Medications   Medication Sig Dispense Refill    omeprazole (PRILOSEC) 20 MG delayed release capsule Take 1 capsule by mouth daily 30 capsule 11     No current facility-administered medications for this visit. Allergies:  No Known Allergies    Gynecologic History:  Patient's last menstrual period was 2022.   Sexually Active: Yes  STD History: Yes, HR-HPV  Abnormal Pap History yes  Birth Control: Yes, jesus    OB History    Para Term  AB Living   3 2 1 1 0 3   SAB IAB Ectopic Molar Multiple Live Births   0 0 0 0 0 3     ______________________________________________________________________  REVIEW OF SYSTEMS:        Constitutional:  Unexpected weight change no  Neurological:  Frequent headaches  no  Ophthalmic:  Recent visual changes no  ENT:   Difficulty swallowing  no  Breast:              Masses   no     Respiratory:  Shortness of breath  no    Cardiovascular: Chest pain   no     Gastrointestinal: Chronic diarrhea/constipation no   Urogenital:  Urinary incontinence  no                                         Heavy/irregular periods           yes                                      Vaginal discharge                   no  Hematological: Bruises easy   no     Endocrine:  Nipple Discharge  no     Hot/Cold Intolerance  no   Psychological:  Mood and affect were wnl yes                                                                                                                                           Physical Exam:    Vitals:    22 1304   BP: 128/77   Position: Sitting   Cuff Size: Medium Adult   Weight: 172 lb (78 kg)   Height: 5' 3\" (1.6 m)       General Appearance:  She does not appear to be in any distress. This  is a well developed, well nourished, well groomed female. Neurological:  The patient is alert and oriented to time, place, person, and situation without any noted sensory motor deficits. Skin:  A brief inspection of the skin revealed no rashes or lesions. Neck:  The neck was supple. There is no tracheal deviation, thyromegaly or supraclavicular adenopathy appreciated. Breast:   The patients breasts were symmetrical.  Breasts are nontender and there  were no masses, discharge or pathologic skin changes. There is no supraclavicular or axillary adenopathy bilaterally. Respiratory: There was unlabored respiratory effort. Lungs clear to ascultation without wheezes, rales or rhonchi in all fields bilaterally. Cardiovascular:  Normal sinus rhythm with a regular rate and without murmur, rubs or gallops. Abdomen: The abdomen was soft and non-tender with no guarding, rebound, CVAT or rigidity. No hernias were appreciated. Bowel sounds were normally active. Pelvic exam:  No vulvar, vaginal or cervical lesions are noted. Normal vaginal discharge present, no significant cystocele, rectocele or enterocele noted. Uterus approximately 16 weeks bulky and irregular. No CMT and extension of uterine leiomyoma into bilateral adnexa. Rectum without external lesions noted. Extremities:  FROM and nontender without clubbing cyanosis or edema. ASSESSMENT:         ICD-10-CM    1. Encounter for gynecological examination with abnormal finding  Z01.411       2. Visit for screening mammogram  Z12.31       3. Menometrorrhagia  N92.1       4. Dysmenorrhea  N94.6       5.  Submucous and subserous leiomyoma of uterus  D25.0     D25.2                       PLAN:  Counseled on abnormal bleeding most likely secondary to uterine fibroids but discussed utility of office EMB. She desires to proceed with office EMB versus possible hysteroscopy, D&C. Pelvic ultrasound ordered. Discussed probability of RAVH/BS and she was given information on hysterectomy. She will be contacted with results of the EMB and ultrasound with recommendations for follow-up. If Negative Cytology, Follow-up screening per current guidelines. Mammograms every 1year. If 38 yo and last mammogram was negative. Mixed incontinence - discussed bladder training and kegel exercises. Info given. Calcium and Vitamin D dosing reviewed. Colonoscopy screening reviewed as well as onset for bone density testing. Birth control and barrier recommendations discussed. STD counseling and prevention reviewed. Routine health maintenance per patients PCP. Return to the office in 1 year or when necessary  Patient was seen with total face to face time of 20 minutes. Irma Singletary M.D., [address] [facility]. [address] [facility]  [place]

## 2022-12-01 NOTE — PROGRESS NOTES
[address] [facility] Dick Figueroa  [address] [facility] [address] [facility] [place] [address]     [unfilled]    Jean Paul Bae is a  3 para 200. She is here today for EMB secondary to     ICD-10-CM    1. Encounter for gynecological examination with abnormal finding  Z01.411 PAP SMEAR      2. Visit for screening mammogram  Z12.31 MARIANA DIGITAL SCREEN W OR WO CAD BILATERAL      3. Menometrorrhagia  N92.1 IN BIOPSY OF UTERUS LINING     US PELVIS COMPLETE      4. Dysmenorrhea  N94.6 IN BIOPSY OF UTERUS LINING     US PELVIS COMPLETE      5. Submucous and subserous leiomyoma of uterus  D25.0 US PELVIS COMPLETE    D25.2           Discussed with patient today abnormal bleeding and management of it. EMB was offered in the office and we did discuss the possibility of hysteroscopy/D&C. Patient agrees to proceed. Physical exam  Vitals:    22 1304   BP: 128/77       No vulvar or vaginal or cervical lesions noted. Normal vaginal discharge present. EMB performed uneventfully. Uterine cavity measured 10-11 cm and a moderate amount of specimen obtained and sent for pathology. Patient tolerated procedure well and left in good condition. She'll be contacted with results and recommendation for follow-up. Nehemiah Musa MD,Mph, [facility].   [facility]

## 2022-12-05 DIAGNOSIS — Z01.411 ENCOUNTER FOR GYNECOLOGICAL EXAMINATION WITH ABNORMAL FINDING: ICD-10-CM

## 2022-12-08 DIAGNOSIS — N94.6 DYSMENORRHEA: ICD-10-CM

## 2022-12-08 DIAGNOSIS — N92.1 MENOMETRORRHAGIA: ICD-10-CM

## 2023-03-13 DIAGNOSIS — Z12.31 VISIT FOR SCREENING MAMMOGRAM: ICD-10-CM

## 2024-03-29 SDOH — ECONOMIC STABILITY: FOOD INSECURITY: WITHIN THE PAST 12 MONTHS, YOU WORRIED THAT YOUR FOOD WOULD RUN OUT BEFORE YOU GOT MONEY TO BUY MORE.: NEVER TRUE

## 2024-03-29 SDOH — ECONOMIC STABILITY: FOOD INSECURITY: WITHIN THE PAST 12 MONTHS, THE FOOD YOU BOUGHT JUST DIDN'T LAST AND YOU DIDN'T HAVE MONEY TO GET MORE.: NEVER TRUE

## 2024-03-29 SDOH — ECONOMIC STABILITY: HOUSING INSECURITY
IN THE LAST 12 MONTHS, WAS THERE A TIME WHEN YOU DID NOT HAVE A STEADY PLACE TO SLEEP OR SLEPT IN A SHELTER (INCLUDING NOW)?: NO

## 2024-03-29 SDOH — ECONOMIC STABILITY: INCOME INSECURITY: HOW HARD IS IT FOR YOU TO PAY FOR THE VERY BASICS LIKE FOOD, HOUSING, MEDICAL CARE, AND HEATING?: NOT VERY HARD

## 2024-03-29 SDOH — ECONOMIC STABILITY: TRANSPORTATION INSECURITY
IN THE PAST 12 MONTHS, HAS LACK OF TRANSPORTATION KEPT YOU FROM MEETINGS, WORK, OR FROM GETTING THINGS NEEDED FOR DAILY LIVING?: NO

## 2024-03-29 ASSESSMENT — PATIENT HEALTH QUESTIONNAIRE - PHQ9
SUM OF ALL RESPONSES TO PHQ QUESTIONS 1-9: 0
1. LITTLE INTEREST OR PLEASURE IN DOING THINGS: NOT AT ALL
SUM OF ALL RESPONSES TO PHQ9 QUESTIONS 1 & 2: 0
2. FEELING DOWN, DEPRESSED OR HOPELESS: NOT AT ALL
2. FEELING DOWN, DEPRESSED OR HOPELESS: NOT AT ALL
SUM OF ALL RESPONSES TO PHQ QUESTIONS 1-9: 0
SUM OF ALL RESPONSES TO PHQ9 QUESTIONS 1 & 2: 0
SUM OF ALL RESPONSES TO PHQ QUESTIONS 1-9: 0
1. LITTLE INTEREST OR PLEASURE IN DOING THINGS: NOT AT ALL
SUM OF ALL RESPONSES TO PHQ QUESTIONS 1-9: 0

## 2024-04-01 ENCOUNTER — OFFICE VISIT (OUTPATIENT)
Dept: FAMILY MEDICINE CLINIC | Age: 44
End: 2024-04-01
Payer: COMMERCIAL

## 2024-04-01 VITALS
SYSTOLIC BLOOD PRESSURE: 116 MMHG | DIASTOLIC BLOOD PRESSURE: 70 MMHG | HEART RATE: 96 BPM | OXYGEN SATURATION: 100 % | HEIGHT: 63 IN | WEIGHT: 173 LBS | BODY MASS INDEX: 30.65 KG/M2

## 2024-04-01 DIAGNOSIS — E55.9 VITAMIN D DEFICIENCY: ICD-10-CM

## 2024-04-01 DIAGNOSIS — K21.00 GASTROESOPHAGEAL REFLUX DISEASE WITH ESOPHAGITIS WITHOUT HEMORRHAGE: ICD-10-CM

## 2024-04-01 DIAGNOSIS — M25.511 ACUTE PAIN OF RIGHT SHOULDER: ICD-10-CM

## 2024-04-01 DIAGNOSIS — D64.9 ANEMIA, UNSPECIFIED TYPE: ICD-10-CM

## 2024-04-01 DIAGNOSIS — Z00.00 WELL ADULT EXAM: Primary | ICD-10-CM

## 2024-04-01 DIAGNOSIS — Z12.31 SCREENING MAMMOGRAM FOR BREAST CANCER: ICD-10-CM

## 2024-04-01 PROCEDURE — 99396 PREV VISIT EST AGE 40-64: CPT | Performed by: FAMILY MEDICINE

## 2024-04-28 NOTE — PATIENT INSTRUCTIONS
Please get your overdue mammogram in July as scheduled.  We will let you know that result as well as today's Pap.  Please read the information given to you on Myfembree and uterine fibroids.  Should you decide to try either medication, please notify the office and we will start the preauthorization process.  Otherwise plan on returning to the office in 1 year or as needed.  Happy belated 44rd birthday and have a great 2024!

## 2024-04-28 NOTE — PROGRESS NOTES
[facility], [facility]  [facility]  [address]  [address]  [place] [place] [address]       DATE OF VISIT:  24        History and Physical    Mala Sanches    :  1980  CHIEF COMPLAINT:    Chief Complaint   Patient presents with    Established New Doctor    Annual Exam     Here for annual last pap 22neg last mammogram and Breast U/S 21 getting done 24                    Mala Sanches is a 44y.o. femaleGRAVIDA 3 PARA 3003   PCP: Peggy Toure,      Mala Sanches returns today for her annual exam.  She is due for her mammogram in July and has been given an order from Dr. NICOLE Toure.  She also has some blood work ordered but has not gotten them done yet.  At her last annual she was having erratic bleeding from her uterine fibroids.  Ultrasound and EMB was performed.  EMB showed normal proliferative endometrial without atypia or malignancy.  We did discuss definitive hysterectomy and she was given information.  She presents today stating that she is still having erratic bleeding with some episodes being heavy with passage of large clots.  She is sexually active and states that she does not have much discomfort with intercourse.  Does also feel pressure on the bladder causing some frequency.  She is having regular bowel movements without constipation or diarrhea.  She denies any UTI symptoms or involuntary loss of urine.  She is otherwise without any significant complaints today.  Mala is still working at [employer].  _____________________________________________________________________  Past Medical History:   Diagnosis Date    Dysplasia of cervix                                                                    Past Surgical History:   Procedure Laterality Date    COLPOSCOPY      INTRAUTERINE DEVICE INSERTION      Mirena  removed 10/12/10    TOOTH EXTRACTION  14    WISDOM TOOTH EXTRACTION       Family History   Problem

## 2024-04-30 ENCOUNTER — OFFICE VISIT (OUTPATIENT)
Dept: OBGYN CLINIC | Age: 44
End: 2024-04-30
Payer: COMMERCIAL

## 2024-04-30 VITALS — WEIGHT: 171 LBS | DIASTOLIC BLOOD PRESSURE: 72 MMHG | BODY MASS INDEX: 30.29 KG/M2 | SYSTOLIC BLOOD PRESSURE: 120 MMHG

## 2024-04-30 DIAGNOSIS — D25.0 SUBMUCOUS AND SUBSEROUS LEIOMYOMA OF UTERUS: ICD-10-CM

## 2024-04-30 DIAGNOSIS — Z01.411 ENCOUNTER FOR GYNECOLOGICAL EXAMINATION WITH ABNORMAL FINDING: Primary | ICD-10-CM

## 2024-04-30 DIAGNOSIS — D25.2 SUBMUCOUS AND SUBSEROUS LEIOMYOMA OF UTERUS: ICD-10-CM

## 2024-04-30 DIAGNOSIS — R10.2 PELVIC PRESSURE IN FEMALE: ICD-10-CM

## 2024-04-30 DIAGNOSIS — N93.9 ABNORMAL UTERINE BLEEDING: ICD-10-CM

## 2024-04-30 PROCEDURE — 99396 PREV VISIT EST AGE 40-64: CPT | Performed by: OBSTETRICS & GYNECOLOGY

## 2024-05-01 DIAGNOSIS — E55.9 VITAMIN D DEFICIENCY: ICD-10-CM

## 2024-05-01 DIAGNOSIS — D64.9 ANEMIA, UNSPECIFIED TYPE: Primary | ICD-10-CM

## 2024-05-01 DIAGNOSIS — D64.9 LOW HEMOGLOBIN: ICD-10-CM

## 2024-05-01 DIAGNOSIS — D50.9 IRON DEFICIENCY ANEMIA, UNSPECIFIED IRON DEFICIENCY ANEMIA TYPE: Primary | ICD-10-CM

## 2024-05-01 LAB
ALBUMIN: 4.1 G/DL
ALK PHOSPHATASE: 69 U/L
ALT SERPL-CCNC: 21 U/L
ANISOCYTOSIS: ABNORMAL
AST SERPL-CCNC: 34 U/L
BASOPHILS ABSOLUTE: 0.01 X10^3UL
BASOPHILS RELATIVE PERCENT: 0.3 %
BILIRUB SERPL-MCNC: 0.3 MG/DL
BUN BLDV-MCNC: 12 MG/DL
CALCIUM SERPL-MCNC: 8.5 MG/DL
CHLORIDE BLD-SCNC: 105 MMOL/L
CHOLESTEROL, TOTAL: 143 MG/DL
CHOLESTEROL/HDL RATIO: 2
CO2: 24 MMOL/L
CREAT SERPL-MCNC: 0.7 MG/DL
EOSINOPHILS ABSOLUTE: 0.07 X10^3UL
EOSINOPHILS RELATIVE PERCENT: 2.3 %
ERYTHROCYTE [DISTWIDTH] IN BLOOD BY AUTOMATED COUNT: 46.8 FL
FOLATE: 7.9 NG/ML
GFR AFRICAN AMERICAN: 110.5 ML/M1.7
GFR NON-AFRICAN AMERICAN: 91.3 ML/M1.7
GLUCOSE: 84 MG/DL
HCT VFR BLD CALC: 25.3 %
HDLC SERPL-MCNC: 73 MG/DL
HEMOGLOBIN: 6.6 G/DL
HYPOCHROMIA: ABNORMAL
IMMATURE GRANULOCYTES %: 0 %
IMMATURE GRANULOCYTES ABSOLUTE: 0 X10^3UL
IRON % SATURATION: 5 %
IRON: 24 UG/DL
LDL CHOLESTEROL: 50 MG/DL
LYMPHOCYTES ABSOLUTE: 1.42 X10^3UL
LYMPHOCYTES RELATIVE PERCENT: 47.5 %
MACROCYTES: ABNORMAL
MCH RBC QN AUTO: 18 PG
MCHC RBC AUTO-ENTMCNC: 26.1 G/DL
MCV RBC AUTO: 68.9 FL
MICROCYTES: ABNORMAL
MONOCYTES ABSOLUTE: 0.38 X10^3UL
MONOCYTES RELATIVE PERCENT: 12.7 %
NEUTROPHILS ABSOLUTE: 1.11 X10^3UL
NEUTROPHILS RELATIVE PERCENT: 37.2 %
OVALOCYTES: ABNORMAL
PLATELET # BLD: 396 X10^3UL
POIKILOCYTES: ABNORMAL
POLYCHROMASIA: ABNORMAL
POTASSIUM SERPL-SCNC: 4.2 MMOL/L
RBC # BLD: 3.67 X10^6UL
SCHISTOCYTES: ABNORMAL
SODIUM BLD-SCNC: 139 MMOL/L
T4 FREE: 1.07 UG/DL
TARGET CELLS: ABNORMAL
TOTAL IRON BINDING CAPACITY: 477 UG/DL
TOTAL PROTEIN: 7.3 G/DL
TRIGL SERPL-MCNC: 99 MG/DL
TSH SERPL DL<=0.05 MIU/L-ACNC: 1.42 MIU/L
VITAMIN B-12: 391 PG/ML
VITAMIN D 25-HYDROXY: 5.2 NG/ML
VLDLC SERPL CALC-MCNC: 20 MG/DL
WBC # BLD: 2.99 X10^3UL

## 2024-05-01 RX ORDER — ASCORBIC ACID 500 MG
500 TABLET ORAL DAILY
Qty: 30 TABLET | Refills: 3 | Status: SHIPPED | OUTPATIENT
Start: 2024-05-01

## 2024-05-01 RX ORDER — CHOLECALCIFEROL (VITAMIN D3) 125 MCG
5000 CAPSULE ORAL DAILY
Qty: 30 CAPSULE | Refills: 11 | Status: SHIPPED | OUTPATIENT
Start: 2024-05-01 | End: 2024-05-31

## 2024-05-01 RX ORDER — FERROUS SULFATE 325(65) MG
325 TABLET ORAL
Qty: 90 TABLET | Refills: 2 | Status: SHIPPED | OUTPATIENT
Start: 2024-05-01

## 2024-08-20 ENCOUNTER — INITIAL CONSULT (OUTPATIENT)
Dept: OBGYN CLINIC | Age: 44
End: 2024-08-20
Payer: COMMERCIAL

## 2024-08-20 VITALS
HEIGHT: 63 IN | DIASTOLIC BLOOD PRESSURE: 82 MMHG | WEIGHT: 178 LBS | BODY MASS INDEX: 31.54 KG/M2 | SYSTOLIC BLOOD PRESSURE: 124 MMHG

## 2024-08-20 DIAGNOSIS — N93.9 ABNORMAL UTERINE BLEEDING: Primary | ICD-10-CM

## 2024-08-20 DIAGNOSIS — N94.6 DYSMENORRHEA: ICD-10-CM

## 2024-08-20 DIAGNOSIS — N92.1 MENOMETRORRHAGIA: ICD-10-CM

## 2024-08-20 DIAGNOSIS — D25.1 INTRAMURAL LEIOMYOMA OF UTERUS: ICD-10-CM

## 2024-08-20 PROCEDURE — 99212 OFFICE O/P EST SF 10 MIN: CPT | Performed by: OBSTETRICS & GYNECOLOGY

## 2024-08-20 NOTE — PATIENT INSTRUCTIONS
Please read the information given to you on uterine fibroids.  For more information you may also want to go to the website of the American College of obstetrics and gynecology or WebMD.  Please also read the information given to you on Myfembree.  The monthly injectable medication is called Lupron and you can find more information through the Internet.  If you decide to try one of the medications, please notify us and we will begin the preauthorization process.

## 2024-08-20 NOTE — PROGRESS NOTES
[facility] [place]  [address]  [address]  [place] [address]      DATE OF VISIT:  24        History and Physical    Mala Sanches    :  1980  CHIEF COMPLAINT:   Chief Complaint   Patient presents with    Consultation     Here to discuss ablation having heavy period with clots every 2- 3 weeks lasting 7-8 days                         DATE OF VISIT:  24           History and Physical     Mala Sanches    :  1980  CHIEF COMPLAINT:           Chief Complaint   Patient presents with    Established New Doctor    Annual Exam       Here for annual last pap 22neg last mammogram and Breast U/S 21 getting done 24                        Mala Sanches is a 44y.o. femaleGRAVIDA 3 PARA 3003   PCP: Peggy Toure,      Mala Sanches was recently seen 2024 for her annual exam.  At her last annual she was having erratic bleeding from her uterine fibroids.  Ultrasound and EMB was performed.  EMB showed normal proliferative endometrial without atypia or malignancy.  She is no longer receiving iron infusions.  We did discuss definitive hysterectomy and she was given information.  She at her annual stating that she is still having erratic bleeding with some episodes being heavy with passage of large clots.  She is sexually active and states that she does not have much discomfort with intercourse.  Does also feel pressure on the bladder causing some frequency.  She is having regular bowel movements without constipation or diarrhea.  She denies any UTI symptoms or involuntary loss of urine.  She is otherwise without any significant complaints today.  Mala is still working at [employer].    Sylvie foley   Technician     Progress Notes      Sign when Signing Visit     Encounter Date: 2022       PELVIC AND TRANSVAGINAL ULTRASOUND (NON-OB)     UTERUS: 10.7 x 9.1 x 5.3 cm  Innumerous intramural fibroids

## 2024-11-18 NOTE — PROGRESS NOTES
[facility] - [place]  [address]  [address]  [facility] [place] [address]      Date: 2024     Mala Sanches    :  1980    Chief Complaint   Patient presents with    Follow-up     Here for follow up was transfused last week      Mala Sanches is a 44y.o. femaleGRAVIDA 3 PARA 3003   PCP: Peggy Toure DO     Mala Sanches was recently seen 2024 for her annual exam.  At her last annual she was having erratic bleeding from her uterine fibroids.  Ultrasound and EMB was performed.  EMB showed normal proliferative endometrial without atypia or malignancy.  She continues to have erratic bleeding.  She just received an iron transfusion last week.  Information.  She is having regular bowel movements without constipation or diarrhea.  She denies any UTI symptoms or involuntary loss of urine.  She is otherwise without any significant complaints today.  Sylvie Foster   Technician     Progress Notes      Sign when Signing Visit     Encounter Date: 2022        PELVIC AND TRANSVAGINAL ULTRASOUND (NON-OB)     UTERUS: 10.7 x 9.1 x 5.3 cm  Innumerous intramural fibroids seen, largest =   6.3 x 6.0 x 7.1 cm w/in the anterior fundal ut  6.2 x 5.7 x 6.3 cm w/in the anterior mid ut     ENDO: 1.2 cm  Displaced posteriorly by fibroids     RT. OVARY: 2.7 x 2.2 x 2.7 cm  unremarkable     LT. OVARY: 3.3 x 3.9 x 3.0 cm  unremarkable     no pelvic free fluid see          Physical exam:    Vitals:    24 1117   BP: 124/80   Site: Right Upper Arm   Position: Sitting   Cuff Size: Medium Adult   Weight: 82.1 kg (181 lb)     General appearance: Patient is in NAD.    Assessment/plan:   Diagnosis Orders   1. Menometrorrhagia        2. Dysmenorrhea        3. Intramural leiomyoma of uterus        4. Iron deficiency anemia due to chronic blood loss          At her annual visit we did discuss medical management of symptomatic uterine fibroids with Lupron and

## 2024-11-19 ENCOUNTER — OFFICE VISIT (OUTPATIENT)
Dept: OBGYN CLINIC | Age: 44
End: 2024-11-19
Payer: COMMERCIAL

## 2024-11-19 VITALS — DIASTOLIC BLOOD PRESSURE: 80 MMHG | BODY MASS INDEX: 32.06 KG/M2 | SYSTOLIC BLOOD PRESSURE: 124 MMHG | WEIGHT: 181 LBS

## 2024-11-19 DIAGNOSIS — N94.6 DYSMENORRHEA: ICD-10-CM

## 2024-11-19 DIAGNOSIS — D50.0 IRON DEFICIENCY ANEMIA DUE TO CHRONIC BLOOD LOSS: ICD-10-CM

## 2024-11-19 DIAGNOSIS — N92.1 MENOMETRORRHAGIA: Primary | ICD-10-CM

## 2024-11-19 DIAGNOSIS — D25.1 INTRAMURAL LEIOMYOMA OF UTERUS: ICD-10-CM

## 2024-11-19 PROCEDURE — 99212 OFFICE O/P EST SF 10 MIN: CPT | Performed by: OBSTETRICS & GYNECOLOGY

## 2025-04-21 SDOH — ECONOMIC STABILITY: FOOD INSECURITY: WITHIN THE PAST 12 MONTHS, YOU WORRIED THAT YOUR FOOD WOULD RUN OUT BEFORE YOU GOT MONEY TO BUY MORE.: NEVER TRUE

## 2025-04-21 SDOH — ECONOMIC STABILITY: INCOME INSECURITY: IN THE LAST 12 MONTHS, WAS THERE A TIME WHEN YOU WERE NOT ABLE TO PAY THE MORTGAGE OR RENT ON TIME?: NO

## 2025-04-21 SDOH — ECONOMIC STABILITY: FOOD INSECURITY: WITHIN THE PAST 12 MONTHS, THE FOOD YOU BOUGHT JUST DIDN'T LAST AND YOU DIDN'T HAVE MONEY TO GET MORE.: NEVER TRUE

## 2025-04-21 SDOH — ECONOMIC STABILITY: TRANSPORTATION INSECURITY
IN THE PAST 12 MONTHS, HAS THE LACK OF TRANSPORTATION KEPT YOU FROM MEDICAL APPOINTMENTS OR FROM GETTING MEDICATIONS?: NO

## 2025-04-21 ASSESSMENT — PATIENT HEALTH QUESTIONNAIRE - PHQ9
1. LITTLE INTEREST OR PLEASURE IN DOING THINGS: NOT AT ALL
1. LITTLE INTEREST OR PLEASURE IN DOING THINGS: NOT AT ALL
SUM OF ALL RESPONSES TO PHQ QUESTIONS 1-9: 0
SUM OF ALL RESPONSES TO PHQ QUESTIONS 1-9: 0
2. FEELING DOWN, DEPRESSED OR HOPELESS: NOT AT ALL
SUM OF ALL RESPONSES TO PHQ QUESTIONS 1-9: 0
SUM OF ALL RESPONSES TO PHQ9 QUESTIONS 1 & 2: 0
2. FEELING DOWN, DEPRESSED OR HOPELESS: NOT AT ALL
SUM OF ALL RESPONSES TO PHQ QUESTIONS 1-9: 0

## 2025-04-24 ENCOUNTER — OFFICE VISIT (OUTPATIENT)
Dept: OBGYN CLINIC | Age: 45
End: 2025-04-24
Payer: COMMERCIAL

## 2025-04-24 VITALS
BODY MASS INDEX: 33.83 KG/M2 | SYSTOLIC BLOOD PRESSURE: 125 MMHG | HEART RATE: 86 BPM | DIASTOLIC BLOOD PRESSURE: 87 MMHG | WEIGHT: 191 LBS

## 2025-04-24 DIAGNOSIS — N93.9 ABNORMAL UTERINE BLEEDING: ICD-10-CM

## 2025-04-24 DIAGNOSIS — D25.1 INTRAMURAL LEIOMYOMA OF UTERUS: Primary | ICD-10-CM

## 2025-04-24 DIAGNOSIS — N92.1 MENOMETRORRHAGIA: ICD-10-CM

## 2025-04-24 DIAGNOSIS — N94.6 DYSMENORRHEA: ICD-10-CM

## 2025-04-24 DIAGNOSIS — D50.0 IRON DEFICIENCY ANEMIA DUE TO CHRONIC BLOOD LOSS: ICD-10-CM

## 2025-04-24 PROCEDURE — 99213 OFFICE O/P EST LOW 20 MIN: CPT | Performed by: STUDENT IN AN ORGANIZED HEALTH CARE EDUCATION/TRAINING PROGRAM

## 2025-04-24 RX ORDER — UBIDECARENONE 75 MG
50 CAPSULE ORAL DAILY
COMMUNITY

## 2025-04-24 NOTE — PROGRESS NOTES
[facility]  [address]  [address]  [place] [address]      Problem Visit      Mala Sanches  [birth year]25                       Primary Care Physician: Peggy Toure DO    CC:   Chief Complaint   Patient presents with    medication review     Still bleeding daily         HPI: Mala Sanches is a 45 y.o. female  No LMP recorded.    The patient was seen and examined.     Patient has known fibroid uterus and was trialing Myfembree.  Patient has noticed that she is still passing very large clots with the Myfembree and she is desiring definitive surgical management at this time.  Due to patient's strict [employer] insurance with no secondary insurance, recommend that patient establish care with a [facility].  Will place an urgent referral at this time.        REVIEW OF SYSTEMS:  Constitutional: negative fever, negative chills  HEENT: negative visual disturbances, negative headaches  Respiratory: negative dyspnea, negative cough  Cardiovascular: negative chest pain,  negative palpitations  Gastrointestinal: negative abdominal pain, negative RUQ pain, negative N/V, negative diarrhea, negative constipation  Genitourinary: negative dysuria, negative vaginal discharge, AUB-F  Dermatological: negative rash  Hematologic: negative bruising  Immunologic/Lymphatic: negative recent illness, negative recent sick contact  Musculoskeletal: negative back pain, negative myalgias, negative arthralgias  Neurological:  negative dizziness, negative weakness  Behavior/Psych: negative depression, negative anxiety    OBSTETRICAL HISTORY:  OB History    Para Term  AB Living   3 2 1 1 0 3   SAB IAB Ectopic Molar Multiple Live Births   0 0 0  0 3      # Outcome Date GA Lbr Beltran/2nd Weight Sex Type Anes PTL Lv   3  14   3.572 kg (7 lb 14 oz) M Vag-Spont  N HERLINDA   2  2005 36w0d  3.09 kg (6 lb 13 oz) M Vag-Spont   HERLINDA   1 Term 2003   2.665 kg (5 lb 14 oz) M